# Patient Record
Sex: FEMALE | Race: WHITE | URBAN - METROPOLITAN AREA
[De-identification: names, ages, dates, MRNs, and addresses within clinical notes are randomized per-mention and may not be internally consistent; named-entity substitution may affect disease eponyms.]

---

## 2020-07-31 ENCOUNTER — APPOINTMENT (RX ONLY)
Dept: URBAN - METROPOLITAN AREA CLINIC 282 | Facility: CLINIC | Age: 45
Setting detail: DERMATOLOGY
End: 2020-07-31

## 2020-07-31 DIAGNOSIS — B07.8 OTHER VIRAL WARTS: ICD-10-CM

## 2020-07-31 PROCEDURE — 11900 INJECT SKIN LESIONS </W 7: CPT | Mod: 59

## 2020-07-31 PROCEDURE — ? IMMUNOTHERAPY: CANDIDA ANTIGEN

## 2020-07-31 PROCEDURE — 17110 DESTRUCTION B9 LES UP TO 14: CPT

## 2020-07-31 PROCEDURE — ? BENIGN DESTRUCTION

## 2020-07-31 PROCEDURE — ? COUNSELING

## 2020-07-31 ASSESSMENT — LOCATION SIMPLE DESCRIPTION DERM: LOCATION SIMPLE: RIGHT PLANTAR SURFACE

## 2020-07-31 ASSESSMENT — LOCATION DETAILED DESCRIPTION DERM: LOCATION DETAILED: RIGHT MEDIAL PLANTAR MIDFOOT

## 2020-07-31 ASSESSMENT — LOCATION ZONE DERM: LOCATION ZONE: FEET

## 2020-07-31 NOTE — PROCEDURE: IMMUNOTHERAPY: CANDIDA ANTIGEN
Render Post-Care Instructions In Note?: no
Total Amount Injected In Ccs: 0.2
Post-Care Instructions: I reviewed with the patient in detail post-care instructions. Mild to moderate itching, tenderness, or swelling at the injection site is common and usually lasts 24 to 48 hours. The patient may elevate the painful area, apply ice for 5 minutes at a time, take tylenol every 4 to 6 hours. Only 5% of Candida immunotherapy patients get flu-like symptoms. This usually lasts only 24 to 48 hours. It is possible to prevent this at future visits by taking tylenol before the injection. If warts are on the fingers, all rings should be removed for 2 days post treatment. The patient understands that multiple treatments may be needed and there is no gaurantee of improvement.
J-Code Units: 1
Consent was obtained from the patient. The risks of candida antigen injection were discussed in detail. Specifically, the risks of redness, itching, pain and allergic reactions were addressed. Prior to injection all of the patient's questions were answered.
Detail Level: Detailed

## 2020-07-31 NOTE — PROCEDURE: BENIGN DESTRUCTION
Anesthesia Volume In Cc: 0.5
Render Note In Bullet Format When Appropriate: No
Medical Necessity Information: It is in your best interest to select a reason for this procedure from the list below. All of these items fulfill various CMS LCD requirements except the new and changing color options.
Consent: The patient's consent was obtained including but not limited to risks of crusting, scabbing, blistering, scarring, darker or lighter pigmentary change, recurrence, incomplete removal and infection.
Medical Necessity Clause: This procedure was medically necessary because the lesions that were treated were:
Post-Care Instructions: I reviewed with the patient in detail post-care instructions. Patient is to wear sunprotection, and avoid picking at any of the treated lesions. Pt may apply Vaseline to crusted or scabbing areas.
Detail Level: Detailed
Treatment Number (Will Not Render If 0): 0

## 2020-08-28 ENCOUNTER — APPOINTMENT (RX ONLY)
Dept: URBAN - METROPOLITAN AREA CLINIC 282 | Facility: CLINIC | Age: 45
Setting detail: DERMATOLOGY
End: 2020-08-28

## 2020-08-28 DIAGNOSIS — B07.8 OTHER VIRAL WARTS: ICD-10-CM

## 2020-08-28 PROCEDURE — ? ADDITIONAL NOTES

## 2020-08-28 PROCEDURE — ? IMMUNOTHERAPY: CANDIDA ANTIGEN

## 2020-08-28 PROCEDURE — 11900 INJECT SKIN LESIONS </W 7: CPT | Mod: 59

## 2020-08-28 PROCEDURE — 17110 DESTRUCTION B9 LES UP TO 14: CPT

## 2020-08-28 PROCEDURE — ? BENIGN DESTRUCTION

## 2020-08-28 ASSESSMENT — LOCATION DETAILED DESCRIPTION DERM: LOCATION DETAILED: RIGHT MEDIAL PLANTAR MIDFOOT

## 2020-08-28 ASSESSMENT — LOCATION SIMPLE DESCRIPTION DERM: LOCATION SIMPLE: RIGHT PLANTAR SURFACE

## 2020-08-28 ASSESSMENT — LOCATION ZONE DERM: LOCATION ZONE: FEET

## 2020-08-28 NOTE — PROCEDURE: BENIGN DESTRUCTION
Medical Necessity Information: It is in your best interest to select a reason for this procedure from the list below. All of these items fulfill various CMS LCD requirements except the new and changing color options.
Consent: The patient's consent was obtained including but not limited to risks of crusting, scabbing, blistering, scarring, darker or lighter pigmentary change, recurrence, incomplete removal and infection.
Anesthesia Volume In Cc: 0.3
Medical Necessity Clause: This procedure was medically necessary because the lesions that were treated were:
Detail Level: Simple
Include Z78.9 (Other Specified Conditions Influencing Health Status) As An Associated Diagnosis?: No
Post-Care Instructions: I reviewed with the patient in detail post-care instructions. Patient is to avoid picking at any of the treated lesions. Pt may apply Vaseline or Polysporin to crusted or scabbing areas.
Treatment Number (Will Not Render If 0): 0

## 2020-08-28 NOTE — PROCEDURE: IMMUNOTHERAPY: CANDIDA ANTIGEN
Consent was obtained from the patient. The risks of candida antigen injection were discussed in detail. Specifically, the risks of redness, itching, pain and allergic reactions were addressed. Prior to injection all of the patient's questions were answered.
Total Amount Injected In Ccs: 0.1
Treatment #: 2
Render Post-Care Instructions In Note?: no
Post-Care Instructions: I reviewed with the patient in detail post-care instructions. Mild to moderate itching, tenderness, or swelling at the injection site is common and usually lasts 24 to 48 hours. The patient may elevate the painful area, apply ice for 5 minutes at a time, take tylenol every 4 to 6 hours. Only 5% of Candida immunotherapy patients get flu-like symptoms. This usually lasts only 24 to 48 hours. It is possible to prevent this at future visits by taking tylenol before the injection. If warts are on the fingers, all rings should be removed for 2 days post treatment. The patient understands that multiple treatments may be needed and there is no gaurantee of improvement.
Detail Level: Detailed
J-Code Units: 1

## 2020-10-01 ENCOUNTER — APPOINTMENT (RX ONLY)
Dept: URBAN - METROPOLITAN AREA CLINIC 282 | Facility: CLINIC | Age: 45
Setting detail: DERMATOLOGY
End: 2020-10-01

## 2020-10-01 DIAGNOSIS — L82.0 INFLAMED SEBORRHEIC KERATOSIS: ICD-10-CM

## 2020-10-01 DIAGNOSIS — B07.8 OTHER VIRAL WARTS: ICD-10-CM | Status: RESOLVED

## 2020-10-01 DIAGNOSIS — L81.4 OTHER MELANIN HYPERPIGMENTATION: ICD-10-CM

## 2020-10-01 DIAGNOSIS — L82.1 OTHER SEBORRHEIC KERATOSIS: ICD-10-CM

## 2020-10-01 DIAGNOSIS — D22 MELANOCYTIC NEVI: ICD-10-CM

## 2020-10-01 PROBLEM — D22.5 MELANOCYTIC NEVI OF TRUNK: Status: ACTIVE | Noted: 2020-10-01

## 2020-10-01 PROCEDURE — 17110 DESTRUCTION B9 LES UP TO 14: CPT

## 2020-10-01 PROCEDURE — ? COUNSELING

## 2020-10-01 PROCEDURE — ? BENIGN DESTRUCTION

## 2020-10-01 PROCEDURE — ? FULL BODY SKIN EXAM

## 2020-10-01 PROCEDURE — 99213 OFFICE O/P EST LOW 20 MIN: CPT | Mod: 25

## 2020-10-01 PROCEDURE — ? ADDITIONAL NOTES

## 2020-10-01 ASSESSMENT — LOCATION ZONE DERM
LOCATION ZONE: FEET
LOCATION ZONE: LEG
LOCATION ZONE: TRUNK
LOCATION ZONE: FACE

## 2020-10-01 ASSESSMENT — LOCATION SIMPLE DESCRIPTION DERM
LOCATION SIMPLE: RIGHT BREAST
LOCATION SIMPLE: RIGHT LOWER BACK
LOCATION SIMPLE: LEFT CHEEK
LOCATION SIMPLE: LEFT THIGH
LOCATION SIMPLE: ABDOMEN
LOCATION SIMPLE: RIGHT THIGH
LOCATION SIMPLE: RIGHT PLANTAR SURFACE

## 2020-10-01 ASSESSMENT — LOCATION DETAILED DESCRIPTION DERM
LOCATION DETAILED: RIGHT MEDIAL PLANTAR MIDFOOT
LOCATION DETAILED: RIGHT LATERAL ABDOMEN
LOCATION DETAILED: RIGHT SUPERIOR LATERAL MIDBACK
LOCATION DETAILED: RIGHT LATERAL BREAST 7-8:00 REGION
LOCATION DETAILED: RIGHT ANTERIOR MEDIAL DISTAL THIGH
LOCATION DETAILED: LEFT INFERIOR CENTRAL MALAR CHEEK
LOCATION DETAILED: RIGHT ANTERIOR PROXIMAL THIGH
LOCATION DETAILED: LEFT ANTERIOR PROXIMAL THIGH

## 2020-10-01 NOTE — PROCEDURE: COUNSELING
Detail Level: Generalized
Detail Level: Simple
Patient Specific Counseling (Will Not Stick From Patient To Patient): Resolved. Monitor.

## 2020-10-01 NOTE — PROCEDURE: BENIGN DESTRUCTION
Include Z78.9 (Other Specified Conditions Influencing Health Status) As An Associated Diagnosis?: No
Anesthesia Volume In Cc: 0.3
Consent: The patient's consent was obtained including but not limited to risks of crusting, scabbing, blistering, scarring, darker or lighter pigmentary change, recurrence, incomplete removal and infection.
Medical Necessity Information: It is in your best interest to select a reason for this procedure from the list below. All of these items fulfill various CMS LCD requirements except the new and changing color options.
Medical Necessity Clause: This procedure was medically necessary because the lesions that were treated were:
Detail Level: Simple
Treatment Number (Will Not Render If 0): 0
Post-Care Instructions: I reviewed with the patient in detail post-care instructions. Patient is to avoid picking at any of the treated lesions. Pt may apply Vaseline or Polysporin to crusted or scabbing areas.

## 2020-10-01 NOTE — HPI: SKIN LESION
Has Your Skin Lesion Been Treated?: not been treated
Is This A New Presentation, Or A Follow-Up?: Skin Lesions
Additional History: She has some irritated lesions on her thighs and right breast.